# Patient Record
Sex: MALE | Race: WHITE | NOT HISPANIC OR LATINO | URBAN - METROPOLITAN AREA
[De-identification: names, ages, dates, MRNs, and addresses within clinical notes are randomized per-mention and may not be internally consistent; named-entity substitution may affect disease eponyms.]

---

## 2017-03-02 ENCOUNTER — HOSPITAL ENCOUNTER (OUTPATIENT)
Dept: NON INVASIVE DIAGNOSTICS | Facility: HOSPITAL | Age: 47
Discharge: HOME | End: 2017-03-02
Payer: COMMERCIAL

## 2017-03-02 ENCOUNTER — TRANSCRIBE ORDERS (OUTPATIENT)
Dept: ADMINISTRATIVE | Facility: HOSPITAL | Age: 47
End: 2017-03-02

## 2017-03-02 DIAGNOSIS — M79.661 RIGHT CALF PAIN: Primary | ICD-10-CM

## 2017-03-02 DIAGNOSIS — M79.661 RIGHT CALF PAIN: ICD-10-CM

## 2017-03-02 PROCEDURE — 93971 EXTREMITY STUDY: CPT

## 2017-08-09 ENCOUNTER — TRANSCRIBE ORDERS (OUTPATIENT)
Dept: ADMINISTRATIVE | Facility: HOSPITAL | Age: 47
End: 2017-08-09

## 2017-08-10 ENCOUNTER — ALLSCRIPTS OFFICE VISIT (OUTPATIENT)
Dept: OTHER | Facility: OTHER | Age: 47
End: 2017-08-10

## 2017-08-10 ENCOUNTER — HOSPITAL ENCOUNTER (OUTPATIENT)
Dept: RADIOLOGY | Facility: HOSPITAL | Age: 47
Discharge: HOME | End: 2017-08-10
Attending: ORTHOPAEDIC SURGERY
Payer: COMMERCIAL

## 2017-08-10 ENCOUNTER — TRANSCRIBE ORDERS (OUTPATIENT)
Dept: ADMINISTRATIVE | Facility: HOSPITAL | Age: 47
End: 2017-08-10

## 2017-08-10 DIAGNOSIS — M54.50 LOW BACK PAIN: ICD-10-CM

## 2017-08-10 DIAGNOSIS — M53.3 SACROCOCCYGEAL DISORDERS, NOT ELSEWHERE CLASSIFIED: ICD-10-CM

## 2017-08-10 DIAGNOSIS — M54.40 LOW BACK PAIN WITH SCIATICA, SCIATICA LATERALITY UNSPECIFIED, UNSPECIFIED BACK PAIN LATERALITY, UNSPECIFIED CHRONICITY: ICD-10-CM

## 2017-08-10 DIAGNOSIS — M53.3 DISORDERS OF SACRUM: Primary | ICD-10-CM

## 2017-08-10 PROCEDURE — 72110 X-RAY EXAM L-2 SPINE 4/>VWS: CPT

## 2017-08-15 ENCOUNTER — GENERIC CONVERSION - ENCOUNTER (OUTPATIENT)
Dept: OTHER | Facility: OTHER | Age: 47
End: 2017-08-15

## 2017-08-15 ENCOUNTER — APPOINTMENT (OUTPATIENT)
Dept: PHYSICAL THERAPY | Facility: CLINIC | Age: 47
End: 2017-08-15
Payer: COMMERCIAL

## 2017-08-15 DIAGNOSIS — M53.3 SACROCOCCYGEAL DISORDERS, NOT ELSEWHERE CLASSIFIED: ICD-10-CM

## 2017-08-15 DIAGNOSIS — M54.50 LOW BACK PAIN: ICD-10-CM

## 2017-08-15 PROCEDURE — 97140 MANUAL THERAPY 1/> REGIONS: CPT

## 2017-08-15 PROCEDURE — G8990 OTHER PT/OT CURRENT STATUS: HCPCS | Performed by: PHYSICAL THERAPIST

## 2017-08-15 PROCEDURE — G8991 OTHER PT/OT GOAL STATUS: HCPCS | Performed by: PHYSICAL THERAPIST

## 2017-08-15 PROCEDURE — 97162 PT EVAL MOD COMPLEX 30 MIN: CPT

## 2017-08-24 ENCOUNTER — APPOINTMENT (OUTPATIENT)
Dept: PHYSICAL THERAPY | Facility: CLINIC | Age: 47
End: 2017-08-24
Payer: COMMERCIAL

## 2017-08-29 ENCOUNTER — GENERIC CONVERSION - ENCOUNTER (OUTPATIENT)
Dept: OTHER | Facility: OTHER | Age: 47
End: 2017-08-29

## 2018-01-14 VITALS
WEIGHT: 189 LBS | HEART RATE: 111 BPM | BODY MASS INDEX: 25.63 KG/M2 | SYSTOLIC BLOOD PRESSURE: 107 MMHG | DIASTOLIC BLOOD PRESSURE: 73 MMHG

## 2021-08-17 VITALS — HEIGHT: 71 IN | WEIGHT: 197 LBS | BODY MASS INDEX: 27.58 KG/M2

## 2021-08-17 DIAGNOSIS — Z12.11 SCREENING FOR COLON CANCER: Primary | ICD-10-CM

## 2021-08-17 RX ORDER — SODIUM PICOSULFATE, MAGNESIUM OXIDE, AND ANHYDROUS CITRIC ACID 10; 3.5; 12 MG/160ML; G/160ML; G/160ML
LIQUID ORAL
Qty: 320 ML | Refills: 0 | Status: SHIPPED | OUTPATIENT
Start: 2021-08-17 | End: 2021-08-23 | Stop reason: HOSPADM

## 2021-08-21 ENCOUNTER — NURSE TRIAGE (OUTPATIENT)
Dept: OTHER | Facility: OTHER | Age: 51
End: 2021-08-21

## 2021-08-21 NOTE — TELEPHONE ENCOUNTER
Start taking between 5p-9p the night before  Patient was advised to start the pills in the morning and then again at 1am, to finish the pills at the time  Reason for Disposition   [1] Caller has URGENT medicine question about med that PCP or specialist prescribed AND [2] triager unable to answer question    Answer Assessment - Initial Assessment Questions  1  NAME of MEDICATION: "What medicine are you calling about?"      Clenpiq    2  QUESTION: "What is your question?" (e g , medication refill, side effect)      Patient reports he needs clarification on start time and instructions  He was advised Colonoscopy prep would be a pill but he received a liquid     3  PRESCRIBING HCP: "Who prescribed it?" Reason: if prescribed by specialist, call should be referred to that group        GI    Protocols used: MEDICATION QUESTION CALL-ADULT-

## 2021-08-21 NOTE — TELEPHONE ENCOUNTER
[facility] sent to on-call provider, Dr Leander Grande, regarding clarification of colonoscopy prep orders  Patient reports not receiving instructions via email  Per Dr Leander Grande, patient should take half of the Clenpiq prep at 4pm on Sunday and then the other half on Monday around 3am  He is able to have a light breakfast on Sunday, but after that only clear liquids  Nothing to eat or drink 3 hours prior to arrival  Made patient aware of the recommendation and verbalized understanding

## 2021-08-23 ENCOUNTER — HOSPITAL ENCOUNTER (OUTPATIENT)
Dept: GASTROENTEROLOGY | Facility: AMBULATORY SURGERY CENTER | Age: 51
Discharge: HOME | End: 2021-08-23
Payer: COMMERCIAL

## 2021-08-23 ENCOUNTER — ANESTHESIA EVENT (OUTPATIENT)
Dept: GASTROENTEROLOGY | Facility: AMBULATORY SURGERY CENTER | Age: 51
End: 2021-08-23

## 2021-08-23 ENCOUNTER — ANESTHESIA (OUTPATIENT)
Dept: GASTROENTEROLOGY | Facility: AMBULATORY SURGERY CENTER | Age: 51
End: 2021-08-23

## 2021-08-23 VITALS
OXYGEN SATURATION: 99 % | HEART RATE: 58 BPM | SYSTOLIC BLOOD PRESSURE: 121 MMHG | DIASTOLIC BLOOD PRESSURE: 65 MMHG | RESPIRATION RATE: 20 BRPM | TEMPERATURE: 97.7 F

## 2021-08-23 DIAGNOSIS — Z12.11 SPECIAL SCREENING FOR MALIGNANT NEOPLASMS, COLON: ICD-10-CM

## 2021-08-23 PROCEDURE — G0121 COLON CA SCRN NOT HI RSK IND: HCPCS | Performed by: INTERNAL MEDICINE

## 2021-08-23 RX ORDER — SODIUM CHLORIDE, SODIUM LACTATE, POTASSIUM CHLORIDE, CALCIUM CHLORIDE 600; 310; 30; 20 MG/100ML; MG/100ML; MG/100ML; MG/100ML
INJECTION, SOLUTION INTRAVENOUS CONTINUOUS PRN
Status: DISCONTINUED | OUTPATIENT
Start: 2021-08-23 | End: 2021-08-23

## 2021-08-23 RX ORDER — PROPOFOL 10 MG/ML
INJECTION, EMULSION INTRAVENOUS AS NEEDED
Status: DISCONTINUED | OUTPATIENT
Start: 2021-08-23 | End: 2021-08-23

## 2021-08-23 RX ORDER — SODIUM CHLORIDE, SODIUM LACTATE, POTASSIUM CHLORIDE, CALCIUM CHLORIDE 600; 310; 30; 20 MG/100ML; MG/100ML; MG/100ML; MG/100ML
75 INJECTION, SOLUTION INTRAVENOUS CONTINUOUS
Status: DISCONTINUED | OUTPATIENT
Start: 2021-08-23 | End: 2021-08-27 | Stop reason: HOSPADM

## 2021-08-23 RX ADMIN — SODIUM CHLORIDE, SODIUM LACTATE, POTASSIUM CHLORIDE, CALCIUM CHLORIDE: 600; 310; 30; 20 INJECTION, SOLUTION INTRAVENOUS at 07:33

## 2021-08-23 RX ADMIN — PROPOFOL 50 MG: 10 INJECTION, EMULSION INTRAVENOUS at 07:41

## 2021-08-23 RX ADMIN — PROPOFOL 100 MG: 10 INJECTION, EMULSION INTRAVENOUS at 07:38

## 2021-08-23 RX ADMIN — PROPOFOL 100 MG: 10 INJECTION, EMULSION INTRAVENOUS at 07:34

## 2021-08-23 RX ADMIN — PROPOFOL 100 MG: 10 INJECTION, EMULSION INTRAVENOUS at 07:36

## 2021-08-23 RX ADMIN — SODIUM CHLORIDE, SODIUM LACTATE, POTASSIUM CHLORIDE, CALCIUM CHLORIDE 75 ML/HR: 600; 310; 30; 20 INJECTION, SOLUTION INTRAVENOUS at 07:12

## 2021-08-23 NOTE — ANESTHESIA POSTPROCEDURE EVALUATION
Post-Op Assessment Note    CV Status:  Stable  Pain Score: 0    Pain management: adequate     Mental Status:  Alert and awake   Hydration Status:  Euvolemic   PONV Controlled:  Controlled   Airway Patency:  Patent      Post Op Vitals Reviewed: Yes      Staff: CRNA         No complications documented      BP   106/67   Temp      Pulse 57   Resp 16   SpO2 98

## 2021-08-23 NOTE — DISCHARGE INSTRUCTIONS
Hemorrhoids   WHAT YOU NEED TO KNOW:   What are hemorrhoids? Hemorrhoids are swollen blood vessels inside your rectum (internal hemorrhoids) or on your anus (external hemorrhoids)  Sometimes a hemorrhoid may prolapse  This means it extends out of your anus  What increases my risk for hemorrhoids? · Pregnancy or obesity    · Straining or sitting for a long time during bowel movements    · Liver disease    · Weak muscles around the anus caused by older age, rectal surgery, or anal intercourse    · A lack of physical activity    · Chronic diarrhea or constipation    · A low-fiber diet    What are the signs and symptoms of hemorrhoids? · Pain or itching around your anus or inside your rectum    · Swelling or bumps around your anus    · Bright red blood in your bowel movement, on the toilet paper, or in the toilet bowl    · Tissue bulging out of your anus (prolapsed hemorrhoids)    · Incontinence (poor control over urine or bowel movements)    How are hemorrhoids diagnosed? Your healthcare provider will ask about your symptoms, the foods you eat, and your bowel movements  He or she will examine your anus for external hemorrhoids  You may need the following:  · A digital rectal exam  is a test to check for hemorrhoids  Your healthcare provider will put a gloved finger inside your anus to feel for the hemorrhoids  · An anoscopy  is a test that uses a scope (small tube with a light and camera on the end) to look at your hemorrhoids  How are hemorrhoids treated? Treatment will depend on your symptoms  You may need any of the following:  · Medicines  can help decrease pain and swelling, and soften your bowel movement  The medicine may be a pill, pad, cream, or ointment  · Procedures  may be used to shrink or remove your hemorrhoid  Examples include rubber-band ligation, sclerotherapy, and photocoagulation  These procedures may be done in your healthcare provider's office   Ask your healthcare provider for more information about these procedures  · Surgery  may be needed to shrink or remove your hemorrhoids  How can I manage my symptoms? · Apply ice on your anus for 15 to 20 minutes every hour or as directed  Use an ice pack, or put crushed ice in a plastic bag  Cover it with a towel before you apply it to your anus  Ice helps prevent tissue damage and decreases swelling and pain  · Take a sitz bath  Fill a bathtub with 4 to 6 inches of warm water  You may also use a sitz bath pan that fits inside a toilet bowl  Sit in the sitz bath for 15 minutes  Do this 3 times a day, and after each bowel movement  The warm water can help decrease pain and swelling  · Keep your anal area clean  Gently wash the area with warm water daily  Soap may irritate the area  After a bowel movement, wipe with moist towelettes or wet toilet paper  Dry toilet paper can irritate the area  How can I help prevent hemorrhoids? · Do not strain to have a bowel movement  Do not sit on the toilet too long  These actions can increase pressure on the tissues in your rectum and anus  · Drink plenty of liquids  Liquids can help prevent constipation  Ask how much liquid to drink each day and which liquids are best for you  · Eat a variety of high-fiber foods  Examples include fruits, vegetables, and whole grains  Ask your healthcare provider how much fiber you need each day  You may need to take a fiber supplement  · Exercise as directed  Exercise, such as walking, may make it easier to have a bowel movement  Ask your healthcare provider to help you create an exercise plan  · Do not have anal sex  Anal sex can weaken the skin around your rectum and anus  · Avoid heavy lifting  This can cause straining and increase your risk for another hemorrhoid  When should I seek immediate care? · You have severe pain in your rectum or around your anus      · You have severe pain in your abdomen and you are vomiting  · You have bleeding from your anus that soaks through your underwear  When should I contact my healthcare provider? · You have frequent and painful bowel movements  · Your hemorrhoid looks or feels more swollen than usual      · You do not have a bowel movement for 2 days or more  · You see or feel tissue coming through your anus  · You have questions or concerns about your condition or care  CARE AGREEMENT:   You have the right to help plan your care  Learn about your health condition and how it may be treated  Discuss treatment options with your healthcare providers to decide what care you want to receive  You always have the right to refuse treatment  The above information is an  only  It is not intended as medical advice for individual conditions or treatments  Talk to your doctor, nurse or pharmacist before following any medical regimen to see if it is safe and effective for you  © Copyright [facility] 2021 Information is for End User's use only and may not be sold, redistributed or otherwise used for commercial purposes  All illustrations and images included in CareNotes® are the copyrighted property of A D A M , Inc  or [address] [place] [address]  Colonoscopy   WHAT YOU NEED TO KNOW:   A colonoscopy is a procedure to examine the inside of your colon (intestine) with a scope  Polyps or tissue growths may have been removed during your colonoscopy  It is normal to feel bloated and to have some abdominal discomfort  You should be passing gas  If you have hemorrhoids or you had polyps removed, you may have a small amount of bleeding  DISCHARGE INSTRUCTIONS:   Seek care immediately if:    You have sudden, severe abdominal pain   You have problems swallowing   You have a large amount of black, sticky bowel movements or blood in your bowel movements   You have sudden trouble breathing        You feel weak, lightheaded, or faint or your heart beats faster than normal for you  Contact your healthcare provider if:    You have a fever and chills   You have nausea or are vomiting   Your abdomen is bloated or feels full and hard   You have abdominal pain   You have black, sticky bowel movements or blood in your bowel movements   You have not had a bowel movement for 3 days after your procedure   You have rash or hives   You have questions or concerns about your procedure  Activity:    Do not lift, strain, or run for 24 hours after your procedure   Rest after your procedure  You have been given medicine to relax you  Do not drive or make important decisions until the day after your procedure  Return to your normal activity as directed   Relieve gas and discomfort from bloating by lying on your right side with a heating pad on your abdomen  You may need to take short walks to help the gas move out  Eat small meals until bloating is relieved  Follow up with your healthcare provider as directed: Write down your questions so you remember to ask them during your visits  If you take a blood thinner, please review the specific instructions from your endoscopist about when you should resume it  These can be found in the Recommendation and Your Medication list sections of this After Visit Summary  Hemorrhoids   WHAT YOU NEED TO KNOW:   What are hemorrhoids? Hemorrhoids are swollen blood vessels inside your rectum (internal hemorrhoids) or on your anus (external hemorrhoids)  Sometimes a hemorrhoid may prolapse  This means it extends out of your anus  What increases my risk for hemorrhoids?    · Pregnancy or obesity    · Straining or sitting for a long time during bowel movements    · Liver disease    · Weak muscles around the anus caused by older age, rectal surgery, or anal intercourse    · A lack of physical activity    · Chronic diarrhea or constipation    · A low-fiber diet    What are the signs and symptoms of hemorrhoids? · Pain or itching around your anus or inside your rectum    · Swelling or bumps around your anus    · Bright red blood in your bowel movement, on the toilet paper, or in the toilet bowl    · Tissue bulging out of your anus (prolapsed hemorrhoids)    · Incontinence (poor control over urine or bowel movements)    How are hemorrhoids diagnosed? Your healthcare provider will ask about your symptoms, the foods you eat, and your bowel movements  He or she will examine your anus for external hemorrhoids  You may need the following:  · A digital rectal exam  is a test to check for hemorrhoids  Your healthcare provider will put a gloved finger inside your anus to feel for the hemorrhoids  · An anoscopy  is a test that uses a scope (small tube with a light and camera on the end) to look at your hemorrhoids  How are hemorrhoids treated? Treatment will depend on your symptoms  You may need any of the following:  · Medicines  can help decrease pain and swelling, and soften your bowel movement  The medicine may be a pill, pad, cream, or ointment  · Procedures  may be used to shrink or remove your hemorrhoid  Examples include rubber-band ligation, sclerotherapy, and photocoagulation  These procedures may be done in your healthcare provider's office  Ask your healthcare provider for more information about these procedures  · Surgery  may be needed to shrink or remove your hemorrhoids  How can I manage my symptoms? · Apply ice on your anus for 15 to 20 minutes every hour or as directed  Use an ice pack, or put crushed ice in a plastic bag  Cover it with a towel before you apply it to your anus  Ice helps prevent tissue damage and decreases swelling and pain  · Take a sitz bath  Fill a bathtub with 4 to 6 inches of warm water  You may also use a sitz bath pan that fits inside a toilet bowl  Sit in the sitz bath for 15 minutes  Do this 3 times a day, and after each bowel movement   The warm water can help decrease pain and swelling  · Keep your anal area clean  Gently wash the area with warm water daily  Soap may irritate the area  After a bowel movement, wipe with moist towelettes or wet toilet paper  Dry toilet paper can irritate the area  How can I help prevent hemorrhoids? · Do not strain to have a bowel movement  Do not sit on the toilet too long  These actions can increase pressure on the tissues in your rectum and anus  · Drink plenty of liquids  Liquids can help prevent constipation  Ask how much liquid to drink each day and which liquids are best for you  · Eat a variety of high-fiber foods  Examples include fruits, vegetables, and whole grains  Ask your healthcare provider how much fiber you need each day  You may need to take a fiber supplement  · Exercise as directed  Exercise, such as walking, may make it easier to have a bowel movement  Ask your healthcare provider to help you create an exercise plan  · Do not have anal sex  Anal sex can weaken the skin around your rectum and anus  · Avoid heavy lifting  This can cause straining and increase your risk for another hemorrhoid  When should I seek immediate care? · You have severe pain in your rectum or around your anus  · You have severe pain in your abdomen and you are vomiting  · You have bleeding from your anus that soaks through your underwear  When should I contact my healthcare provider? · You have frequent and painful bowel movements  · Your hemorrhoid looks or feels more swollen than usual      · You do not have a bowel movement for 2 days or more  · You see or feel tissue coming through your anus  · You have questions or concerns about your condition or care  CARE AGREEMENT:   You have the right to help plan your care  Learn about your health condition and how it may be treated   Discuss treatment options with your healthcare providers to decide what care you want to receive  You always have the right to refuse treatment  The above information is an  only  It is not intended as medical advice for individual conditions or treatments  Talk to your doctor, nurse or pharmacist before following any medical regimen to see if it is safe and effective for you  © Copyright [address] 2021 Information is for End User's use only and may not be sold, redistributed or otherwise used for commercial purposes   All illustrations and images included in CareNotes® are the copyrighted property of A D A M [employer]  or [address] [place] [address]

## 2021-08-23 NOTE — ANESTHESIA PREPROCEDURE EVALUATION
Procedure:  COLONOSCOPY    Relevant Problems   No relevant active problems      CAD/PCI/MI/CHF -- denies  COPD/ASTHMA/VIKY -- denies  PROBS WITH PRIOR ANESTHESIA -- denies  NPO STATUS CONFIRMED    Lab Results   Component Value Date    WBC 9 26 01/16/2014    HGB 11 6 (L) 01/16/2014     (H) 01/16/2014     Lab Results   Component Value Date    K 3 8 01/16/2014    BUN 13 01/16/2014    CREATININE 0 72 01/16/2014    GLUCOSE 112 01/16/2014     No results found for: PTT   Lab Results   Component Value Date    INR 1 06 01/14/2014       Blood type O    No results found for: HGBA1C      Physical Exam    Airway    Mallampati score: II  TM Distance: >3 FB       Dental       Cardiovascular      Pulmonary  Pulmonary exam normal     Other Findings        Anesthesia Plan  ASA Score- 2     Anesthesia Type- IV sedation with anesthesia with ASA Monitors  Additional Monitors:   Airway Plan:     Comment: ANNA MARIE Torres , have personally seen and evaluated the patient prior to anesthetic care  I have reviewed the pre-anesthetic record, and other medical records if appropriate to the anesthetic care  If a CRNA is involved in the case, I have reviewed the CRNA assessment, if present, and agree  Risks/benefits and alternatives discussed with patient including possible PONV, sore throat, and possibility of rare anesthetic and surgical emergencies          Plan Factors-    Chart reviewed  EKG reviewed  Imaging results reviewed  Existing labs reviewed  Patient summary reviewed  Induction-     Postoperative Plan-     Informed Consent- Anesthetic plan and risks discussed with patient  I personally reviewed this patient with the CRNA  Discussed and agreed on the Anesthesia Plan with the CRNA  Brooke Hart

## 2022-03-01 ENCOUNTER — HOSPITAL ENCOUNTER (EMERGENCY)
Facility: HOSPITAL | Age: 52
Discharge: HOME | End: 2022-03-01
Attending: EMERGENCY MEDICINE | Admitting: EMERGENCY MEDICINE
Payer: COMMERCIAL

## 2022-03-01 ENCOUNTER — APPOINTMENT (EMERGENCY)
Dept: RADIOLOGY | Facility: HOSPITAL | Age: 52
End: 2022-03-01
Payer: COMMERCIAL

## 2022-03-01 VITALS
SYSTOLIC BLOOD PRESSURE: 152 MMHG | HEIGHT: 71 IN | WEIGHT: 197 LBS | BODY MASS INDEX: 27.58 KG/M2 | TEMPERATURE: 97.9 F | OXYGEN SATURATION: 94 % | RESPIRATION RATE: 20 BRPM | HEART RATE: 71 BPM | DIASTOLIC BLOOD PRESSURE: 72 MMHG

## 2022-03-01 DIAGNOSIS — M54.16 LUMBAR RADICULOPATHY, ACUTE: Primary | ICD-10-CM

## 2022-03-01 PROCEDURE — 99284 EMERGENCY DEPT VISIT MOD MDM: CPT | Performed by: PHYSICIAN ASSISTANT

## 2022-03-01 PROCEDURE — 99284 EMERGENCY DEPT VISIT MOD MDM: CPT

## 2022-03-01 PROCEDURE — 72100 X-RAY EXAM L-S SPINE 2/3 VWS: CPT

## 2022-03-01 RX ORDER — LIDOCAINE 50 MG/G
1 PATCH TOPICAL ONCE
Status: DISCONTINUED | OUTPATIENT
Start: 2022-03-01 | End: 2022-03-01 | Stop reason: HOSPADM

## 2022-03-01 RX ORDER — HYDROCODONE BITARTRATE AND ACETAMINOPHEN 5; 325 MG/1; MG/1
1 TABLET ORAL ONCE
Status: COMPLETED | OUTPATIENT
Start: 2022-03-01 | End: 2022-03-01

## 2022-03-01 RX ORDER — HYDROCODONE BITARTRATE AND ACETAMINOPHEN 5; 325 MG/1; MG/1
1 TABLET ORAL EVERY 4 HOURS PRN
Qty: 15 TABLET | Refills: 0 | Status: SHIPPED | OUTPATIENT
Start: 2022-03-01 | End: 2022-03-04

## 2022-03-01 RX ADMIN — LIDOCAINE 5% 1 PATCH: 700 PATCH TOPICAL at 10:27

## 2022-03-01 RX ADMIN — HYDROCODONE BITARTRATE AND ACETAMINOPHEN 1 TABLET: 5; 325 TABLET ORAL at 10:27

## 2022-03-01 NOTE — ED NOTES
Patient ambulatory to stretcher   However patient limping due to pain      Jesusita Kenny RN  03/01/22 [id number]

## 2022-03-01 NOTE — DISCHARGE INSTRUCTIONS
Rest, heating pad to back  Continue prednisone, flexeril, and motrin for pain  Take vicodin for severe pain  Keep appointment with spine and pain management on MOnday  Return to ER if groin numbness or bowel/bladder dysfunction

## 2022-03-01 NOTE — ED PROVIDER NOTES
History  Chief Complaint   Patient presents with    Back Pain     patient presents to the ED with c/o sciatic back pain that began 4 weeks ago, states he saw a chiropractor and it helped with the back pain but the sciatic nerve pain isnt any better      Patient is a 45 y/o M with h/o low back pain with laminectomy that presents to the ED with right lower back pain that radiates down right leg x 4 weeks  He states pain is worse with movement and lying on back and sitting  He denies bowel/bladder dysfunction  No weakness  He states his right leg feels numb at times  No fevers, chills  He went to chiropractor, has been taking flexeril, and prednisone  He has an appointment with spine center on Monday  He states his PCP told him to come to the ER for further imaging  No history of trauma  History provided by:  Patient  Back Pain  Location:  Sacro-iliac joint  Quality:  Aching  Radiates to:  R thigh  Pain severity:  Moderate  Onset quality:  Gradual  Duration:  4 weeks  Timing:  Constant  Progression:  Worsening  Chronicity:  New  Context: not falling, not jumping from heights, not lifting heavy objects, not recent illness, not recent injury and not twisting    Relieved by: certain positions  Worsened by: Movement, lying down and sitting  Ineffective treatments:  NSAIDs and muscle relaxants  Associated symptoms: leg pain and numbness    Associated symptoms: no abdominal pain, no abdominal swelling, no bladder incontinence, no bowel incontinence, no dysuria, no fever, no headaches, no pelvic pain, no perianal numbness, no tingling and no weakness        None       History reviewed  No pertinent past medical history  Past Surgical History:   Procedure Laterality Date    BACK SURGERY      Laminectomy        Family History   Problem Relation Age of Onset    Heart disease Father     Stroke Father      I have reviewed and agree with the history as documented      E-Cigarette/Vaping    E-Cigarette Use Never User      E-Cigarette/Vaping Substances    Nicotine No     THC No     CBD No     Flavoring No     Other No     Unknown No      Social History     Tobacco Use    Smoking status: Never Smoker    Smokeless tobacco: Never Used   Vaping Use    Vaping Use: Never used   Substance Use Topics    Alcohol use: Yes     Comment: occasional     Drug use: Never       Review of Systems   Constitutional: Negative for chills and fever  Gastrointestinal: Negative for abdominal pain and bowel incontinence  Genitourinary: Negative for bladder incontinence, difficulty urinating, dysuria and pelvic pain  Musculoskeletal: Positive for back pain  Negative for neck pain  Skin: Negative for color change and rash  Neurological: Positive for numbness  Negative for dizziness, tingling, facial asymmetry, speech difficulty, weakness, light-headedness and headaches  All other systems reviewed and are negative  Physical Exam  Physical Exam  Vitals and nursing note reviewed  Constitutional:       General: He is in acute distress (patient appears to be in pain, lying on left side  )  Appearance: Normal appearance  He is well-developed, well-groomed and normal weight  He is not ill-appearing or diaphoretic  HENT:      Head: Normocephalic and atraumatic  Right Ear: External ear normal       Left Ear: External ear normal       Nose: Nose normal    Eyes:      Conjunctiva/sclera: Conjunctivae normal    Cardiovascular:      Rate and Rhythm: Normal rate and regular rhythm  Heart sounds: Normal heart sounds  Pulmonary:      Effort: Pulmonary effort is normal       Breath sounds: Normal breath sounds  No wheezing, rhonchi or rales  Abdominal:      General: Abdomen is flat  Bowel sounds are normal       Palpations: Abdomen is soft  Tenderness: There is no abdominal tenderness  Musculoskeletal:      Cervical back: Normal and normal range of motion   No spinous process tenderness or muscular tenderness  Thoracic back: Normal       Lumbar back: Tenderness (over right SI joint) present  No swelling, deformity or bony tenderness  Decreased range of motion (due to pain)  Negative right straight leg raise test and negative left straight leg raise test    Skin:     General: Skin is warm and dry  Coloration: Skin is not pale  Findings: No bruising  Neurological:      Mental Status: He is alert and oriented to person, place, and time  Cranial Nerves: Cranial nerves are intact  Sensory: Sensation is intact  No sensory deficit  Motor: No weakness, tremor or abnormal muscle tone  Deep Tendon Reflexes:      Reflex Scores:       Patellar reflexes are 2+ on the right side and 2+ on the left side  Comments: Patient able to dorsiflex b/l great toe  Psychiatric:         Mood and Affect: Mood normal          Speech: Speech normal          Behavior: Behavior is cooperative  Vital Signs  ED Triage Vitals   Temperature Pulse Respirations Blood Pressure SpO2   03/01/22 0919 03/01/22 0919 03/01/22 0919 03/01/22 0919 03/01/22 0919   97 9 °F (36 6 °C) 104 20 156/93 100 %      Temp src Heart Rate Source Patient Position - Orthostatic VS BP Location FiO2 (%)   -- 03/01/22 0919 -- -- --    Monitor         Pain Score       03/01/22 0917       8           Vitals:    03/01/22 1030 03/01/22 1045 03/01/22 1100 03/01/22 1115   BP: 140/80  152/72    Pulse: 84 78 69 71         Visual Acuity      ED Medications  Medications   lidocaine (LIDODERM) 5 % patch 1 patch (1 patch Topical Medication Applied 3/1/22 1027)   HYDROcodone-acetaminophen (NORCO) 5-325 mg per tablet 1 tablet (1 tablet Oral Given 3/1/22 1027)       Diagnostic Studies  Results Reviewed     None                 XR lumbar spine 2 or 3 views   ED Interpretation by Kathe Mckeon PA-C (03/01 1029)   Degenerative changes  No acute abnormalities  Straightening of normal lordosis  Procedures  Procedures         ED Course                               SBIRT 20yo+      Most Recent Value   SBIRT (24 yo +)    In order to provide better care to our patients, we are screening all of our patients for alcohol and drug use  Would it be okay to ask you these screening questions? No Filed at: 03/01/2022 9436   Initial Alcohol Screen: US AUDIT-C     1  How often do you have a drink containing alcohol? 1 Filed at: 03/01/2022 0951   2  How many drinks containing alcohol do you have on a typical day you are drinking? 0 Filed at: 03/01/2022 0951   3a  Male UNDER 65: How often do you have five or more drinks on one occasion? 0 Filed at: 03/01/2022 0951   3b  FEMALE Any Age, or MALE 65+: How often do you have 4 or more drinks on one occassion? 0 Filed at: 03/01/2022 0951   Audit-C Score 1 Filed at: 03/01/2022 1634   KENYA: How many times in the past year have you    Used an illegal drug or used a prescription medication for non-medical reasons? Never Filed at: 03/01/2022 5468                    MDM  Number of Diagnoses or Management Options  Lumbar radiculopathy, acute: established and worsening  Diagnosis management comments: Patient with low back pain radiating down right leg, no bowel/bladder dysfunction, groin numbness, fevers, or weakness, no need for emergent MRI  Patient does have appt with specialist in 6 days  He has tried muscle relaxants, NSAIDS, steroids, without relief  He did receive relief with vicodin, reviewed PDMP, no recent narcotic prescriptions, will give short course until he can get into pain management  Patient did throw away the lidocaine patch because he said they never work  Return precautions given          Amount and/or Complexity of Data Reviewed  Tests in the radiology section of CPT®: ordered and reviewed  Independent visualization of images, tracings, or specimens: yes    Patient Progress  Patient progress: stable      Disposition  Final diagnoses:   Lumbar radiculopathy, acute     Time reflects when diagnosis was documented in both MDM as applicable and the Disposition within this note     Time User Action Codes Description Comment    3/1/2022 11:22 AM Corbin Lancaster Add [M54 16] Lumbar radiculopathy, acute       ED Disposition     ED Disposition Condition Date/Time Comment    Discharge Stable Tue Mar 1, 2022 11:15 AM Mckinley Saucedo discharge to home/self care  Follow-up Information     Follow up With Specialties Details Why [address], DO Family Medicine In 3 days For recheck [address] [facility]            Discharge Medication List as of 3/1/2022 11:25 AM      START taking these medications    Details   HYDROcodone-acetaminophen (Norco) 5-325 mg per tablet Take 1 tablet by mouth every 4 (four) hours as needed for pain for up to 3 days Max Daily Amount: 6 tablets, Starting Tue 3/1/2022, Until Fri 3/4/2022 at 2359, Normal             No discharge procedures on file      PDMP Review       Value Time User    PDMP Reviewed  Yes 3/1/2022 12:24 Annie Lamar PA-C          ED Provider  Electronically Signed by           Lisa Gutierrez PA-C  03/01/22 5613

## 2022-03-01 NOTE — ED NOTES
Patient removed lidoderm patch as the cool sensation was causing discomfort        Herbert Maravilla RN  03/01/22 4339

## 2023-09-15 ENCOUNTER — HOSPITAL ENCOUNTER (OUTPATIENT)
Dept: CT IMAGING | Facility: HOSPITAL | Age: 53
Discharge: HOME | End: 2023-09-15
Attending: FAMILY MEDICINE
Payer: COMMERCIAL

## 2023-09-15 DIAGNOSIS — Z91.89 OTHER SPECIFIED PERSONAL RISK FACTORS, NOT ELSEWHERE CLASSIFIED: ICD-10-CM

## 2023-09-15 PROCEDURE — 75571 CT HRT W/O DYE W/CA TEST: CPT

## 2023-09-15 PROCEDURE — G1004 CDSM NDSC: HCPCS

## 2023-10-11 ENCOUNTER — OFFICE VISIT (OUTPATIENT)
Dept: CARDIOLOGY CLINIC | Facility: CLINIC | Age: 53
End: 2023-10-11
Payer: COMMERCIAL

## 2023-10-11 VITALS
SYSTOLIC BLOOD PRESSURE: 132 MMHG | DIASTOLIC BLOOD PRESSURE: 80 MMHG | HEART RATE: 68 BPM | WEIGHT: 199.6 LBS | BODY MASS INDEX: 27.94 KG/M2 | HEIGHT: 71 IN

## 2023-10-11 DIAGNOSIS — Z82.49 FAMILY HISTORY OF HEART DISEASE: ICD-10-CM

## 2023-10-11 DIAGNOSIS — Z76.89 ENCOUNTER TO ESTABLISH CARE: Primary | ICD-10-CM

## 2023-10-11 DIAGNOSIS — Z13.6 SCREENING FOR HEART DISEASE: ICD-10-CM

## 2023-10-11 DIAGNOSIS — R07.89 CHEST TIGHTNESS: ICD-10-CM

## 2023-10-11 DIAGNOSIS — E78.2 MIXED HYPERLIPIDEMIA: ICD-10-CM

## 2023-10-11 PROCEDURE — 99204 OFFICE O/P NEW MOD 45 MIN: CPT | Performed by: INTERNAL MEDICINE

## 2023-10-11 PROCEDURE — 93000 ELECTROCARDIOGRAM COMPLETE: CPT | Performed by: INTERNAL MEDICINE

## 2023-10-11 NOTE — PROGRESS NOTES
Cardiology Consultation     Lissett Zurita  [id number]  1970  HEART & VASCULAR [facility] [place]  [address] [facility] [address]    1. Encounter to establish care  POCT ECG      2. Screening for heart disease  Stress test only, exercise    Echo complete w/ contrast if indicated      3. Family history of heart disease  Stress test only, exercise    Echo complete w/ contrast if indicated      4. Mixed hyperlipidemia  Stress test only, exercise    Echo complete w/ contrast if indicated      5. Chest tightness  Stress test only, exercise    Echo complete w/ contrast if indicated        Chief Complaint   Patient presents with    Advice Only     No cardiac complaints, here to establish care. HPI: Patient is here for establishment of cardiac care and evaluation of cardiac risk factors. Family history of premature heart disease in father and paternal GF along with cousin on his father's side with heart disease/MI/death in 52's. No reported palpitations, lightheadedness, syncope, LE edema, orthopnea, PND, or significant weight changes. Patient remains active without any increased fatigue out of the ordinary. He does exercise, and does push himself, but does get some tightness and shortness of breath with exertion at times. Patient Active Problem List   Diagnosis    Screening for heart disease    Family history of heart disease    Mixed hyperlipidemia    Chest tightness     History reviewed. No pertinent past medical history.   Social History     Socioeconomic History    Marital status: /Civil Union     Spouse name: Not on file    Number of children: Not on file    Years of education: Not on file    Highest education level: Not on file   Occupational History    Not on file   Tobacco Use    Smoking status: Never    Smokeless tobacco: Never   Vaping Use    Vaping Use: Never used   Substance and Sexual Activity Alcohol use: Yes     Comment: occasional     Drug use: Never    Sexual activity: Not on file   Other Topics Concern    Not on file   Social History Narrative    Not on file     Social Determinants of Health     Financial Resource Strain: Not on file   Food Insecurity: Not on file   Transportation Needs: Not on file   Physical Activity: Not on file   Stress: Not on file   Social Connections: Not on file   Intimate Partner Violence: Not on file   Housing Stability: Not on file      Family History   Problem Relation Age of Onset    Heart disease Father     Stroke Father      Past Surgical History:   Procedure Laterality Date    BACK SURGERY      Laminectomy        Current Outpatient Medications:     Atorvastatin Calcium 20 MG/5ML SUSP, , Disp: , Rfl:   No Known Allergies  Vitals:    10/11/23 0941   BP: 132/80   BP Location: Right arm   Patient Position: Sitting   Cuff Size: Large   Pulse: 68   Weight: 90.5 kg (199 lb 9.6 oz)   Height: 5' 11" (1.803 m)       Labs:  No visits with results within 6 Month(s) from this visit.    Latest known visit with results is:   Conversion Encounter on 01/01/2014   Component Date Value    WBC 01/01/2014 12.12 (H)     RBC 01/01/2014 4.38     Hemoglobin 01/01/2014 12.6     Hematocrit 01/01/2014 38.2     MCV 01/01/2014 87     MCH 01/01/2014 28.8     MCHC 01/01/2014 33.0     RDW 01/01/2014 12.2     Platelets 50/40/3819 259     Neutrophils Relative 01/01/2014 65     Lymphocytes Relative 01/01/2014 17     Monocytes Relative 01/01/2014 15 (H)     Eosinophils Relative 01/01/2014 3     Basophils Relative 01/01/2014 0     Neutrophils Absolute 01/01/2014 7.88 (H)     Lymphocytes Absolute 01/01/2014 2.06     Monocytes Absolute 01/01/2014 1.82 (H)     Eosinophils Absolute 01/01/2014 0.36     Basophils Absolute 01/01/2014 0.00     MPV 01/01/2014 9.0     Sodium 01/01/2014 139     Potassium 01/01/2014 4.2     Chloride 01/01/2014 101     CO2 01/01/2014 34 (H)     Anion Gap 01/01/2014 4     Glucose 01/01/2014 114     BUN 01/01/2014 12     Calcium 01/01/2014 9.5     Creatinine 01/01/2014 0.70     AAGFR 01/01/2014 >60     NAAGFR 01/01/2014 >60     WBC 01/01/2014 12.54 (H)     RBC 01/01/2014 4.40     Hemoglobin 01/01/2014 12.6     Hematocrit 01/01/2014 38.5     MCV 01/01/2014 88     MCH 01/01/2014 28.6     MCHC 01/01/2014 32.7     RDW 01/01/2014 12.3     Platelets 99/95/5147 238     MPV 01/01/2014 9.5      No results found for: "CHOL", "TRIG", "HDL", "LDLDIRECT"  Imaging: CT coronary calcium score    Result Date: 9/20/2023  Narrative: CT CORONARY ARTERY CALCIUM SCREENING WITHOUT INTRAVENOUS CONTRAST INDICATION:  Z91.89: Other specified personal risk factors, not elsewhere classified. Assess for calcific coronary plaque. Patient Age:  48 years Patient Gender:  Male. COMPARISON:  None. TECHNIQUE: Low radiation dose computed tomography of the heart was performed with EKG gating, suspended respiration, and without intravenous contrast.  This examination, like all CT scans performed in the [facility], was performed utilizing techniques to minimize radiation dose exposure, including the use of iterative reconstruction and automated exposure control. Postprocessing was performed on a 3-D computer workstation to measure the amount of calcium in the coronary arteries. Imaging was limited to the heart and the immediately adjacent lungs.  FINDINGS: Coronary artery calcium score breakdown is as follows: Left main coronary artery:  0 Left anterior descending coronary artery and diagonal branches:  0 Left circumflex coronary artery and left marginal branches:  19 Right coronary artery and right marginal branches:  0 Posterior descending coronary artery: 0 TOTAL coronary calcium score:  19 Calcium score PERCENTILE of age, race, and gender matched database participants in the Multi-Ethnic Study of Atherosclerosis (ARELLANO) trial:   65 HEART/GREAT [place]SELS: No significant low radiation dose noncontrast CT abnormality of the heart. No thoracic aortic aneurysm. EXTRACARDIAC FINDINGS: No significant findings identified on limited small field of view low radiation dose noncontrast images of the chest at the level of the heart. Impression: Total coronary calcium score equals 19. For more useful information regarding the prognostic significance of the calcium score, please consult the calculator at the website [email or web address]://www.helen-young.org/. aspx. Workstation performed: [id number]       Review of Systems:  Review of Systems   Constitutional:  Negative for activity change, appetite change, fatigue and fever. HENT:  Negative for nosebleeds and sore throat. Eyes:  Negative for photophobia and visual disturbance. Respiratory:  Positive for chest tightness and shortness of breath. Negative for cough and wheezing. Cardiovascular:  Negative for chest pain, palpitations and leg swelling. Gastrointestinal:  Negative for abdominal pain, diarrhea, nausea and vomiting. Endocrine: Negative for polyuria. Genitourinary:  Negative for dysuria, frequency and hematuria. Musculoskeletal:  Negative for arthralgias, back pain and gait problem. Skin:  Negative for pallor and rash. Neurological:  Negative for dizziness, syncope, speech difficulty and light-headedness. Hematological:  Does not bruise/bleed easily. Psychiatric/Behavioral:  Negative for agitation, behavioral problems and confusion. Physical Exam:  Physical Exam  Vitals reviewed. Constitutional:       General: He is not in acute distress. Appearance: He is well-developed. He is not diaphoretic. HENT:      Head: Normocephalic and atraumatic. Nose: Nose normal.   Eyes:      General: No scleral icterus. Pupils: Pupils are equal, round, and reactive to light. Neck:      Vascular: No JVD. Cardiovascular:      Rate and Rhythm: Normal rate and regular rhythm.       Heart sounds: S1 normal and S2 normal. Heart sounds not distant. No murmur heard. No systolic murmur is present. No friction rub. No gallop. No S3 sounds. Pulmonary:      Effort: Pulmonary effort is normal. No respiratory distress. Breath sounds: Normal breath sounds. No wheezing or rales. Abdominal:      General: Bowel sounds are normal. There is no distension. Palpations: Abdomen is soft. Musculoskeletal:         General: No deformity. Cervical back: Normal range of motion and neck supple. Skin:     General: Skin is warm and dry. Findings: No erythema. Neurological:      Mental Status: He is alert and oriented to person, place, and time. Cranial Nerves: No cranial nerve deficit. Psychiatric:         Behavior: Behavior normal.       Blood pressure 132/80, pulse 68, height 5' 11" (1.803 m), weight 90.5 kg (199 lb 9.6 oz). EKG:  Normal sinus rhythm  Normal ECG    Discussion/Summary:  Evaluation of cardiac risk factors and screening for heart disease: Patient has a coronary artery calcium score of 19 in the left circumflex distribution, placing him in the 65th percentile for age and gender matched individuals. Has cardiac risk factors including premature family history of heart disease, almost age, male, and hyperlipidemia currently on statin. Most recent lipid panel was done in Sept 2023 revealing  TG 89 HDL 65 and . We will also check treadmill stress test along with echocardiogram to evaluate for any structural heart disease, especially in light of symptoms more recently.

## 2023-10-11 NOTE — LETTER
October 11, 2023     [address] [address]    Patient: Angel Obregon   YOB: 1970   Date of Visit: 10/11/2023       Dear Dr. Raffaele Roque:    Thank you for referring Tristin Smallwood to me for evaluation. Below are my notes for this consultation. If you have questions, please do not hesitate to call me. I look forward to following your patient along with you. Sincerely,        Laura Morgan MD        CC: No Recipients    Laura Morgan MD  10/11/2023  9:56 AM  Incomplete                                             Cardiology Consultation     Angel Obregon  [id number]  1970  [facility] [place]  [address] [facility] [id number]    1. Encounter to establish care  POCT ECG        Chief Complaint   Patient presents with   • Advice Only     No cardiac complaints, here to establish care. HPI: Patient is here for establishment of cardiac care and evaluation of cardiac risk factors. Patient feels well, without complaints. No reported chest pain, shortness of breath, palpitations, lightheadedness, syncope, LE edema, orthopnea, PND, or significant weight changes. Patient remains active without any increased fatigue out of the ordinary. There is no problem list on file for this patient. No past medical history on file.   Social History     Socioeconomic History   • Marital status: /Civil Union     Spouse name: Not on file   • Number of children: Not on file   • Years of education: Not on file   • Highest education level: Not on file   Occupational History   • Not on file   Tobacco Use   • Smoking status: Never   • Smokeless tobacco: Never   Vaping Use   • Vaping Use: Never used   Substance and Sexual Activity   • Alcohol use: Yes     Comment: occasional    • Drug use: Never   • Sexual activity: Not on file   Other Topics Concern   • Not on file   Social History Narrative   • Not on file     Social Determinants of Health     Financial Resource Strain: Not on file   Food Insecurity: Not on file   Transportation Needs: Not on file   Physical Activity: Not on file   Stress: Not on file   Social Connections: Not on file   Intimate Partner Violence: Not on file   Housing Stability: Not on file      Family History   Problem Relation Age of Onset   • Heart disease Father    • Stroke Father      Past Surgical History:   Procedure Laterality Date   • BACK SURGERY      Laminectomy        Current Outpatient Medications:   •  Atorvastatin Calcium 20 MG/5ML SUSP, , Disp: , Rfl:   No Known Allergies  Vitals:    10/11/23 0941   BP: 132/80   BP Location: Right arm   Patient Position: Sitting   Cuff Size: Large   Pulse: 68   Weight: 90.5 kg (199 lb 9.6 oz)   Height: 5' 11" (1.803 m)       Labs:  No visits with results within 6 Month(s) from this visit.    Latest known visit with results is:   Conversion Encounter on 01/01/2014   Component Date Value   • WBC 01/01/2014 12.12 (H)    • RBC 01/01/2014 4.38    • Hemoglobin 01/01/2014 12.6    • Hematocrit 01/01/2014 38.2    • MCV 01/01/2014 87    • MCH 01/01/2014 28.8    • MCHC 01/01/2014 33.0    • RDW 01/01/2014 12.2    • Platelets 11/16/6722 259    • Neutrophils Relative 01/01/2014 65    • Lymphocytes Relative 01/01/2014 17    • Monocytes Relative 01/01/2014 15 (H)    • Eosinophils Relative 01/01/2014 3    • Basophils Relative 01/01/2014 0    • Neutrophils Absolute 01/01/2014 7.88 (H)    • Lymphocytes Absolute 01/01/2014 2.06    • Monocytes Absolute 01/01/2014 1.82 (H)    • Eosinophils Absolute 01/01/2014 0.36    • Basophils Absolute 01/01/2014 0.00    • MPV 01/01/2014 9.0    • Sodium 01/01/2014 139    • Potassium 01/01/2014 4.2    • Chloride 01/01/2014 101    • CO2 01/01/2014 34 (H)    • Anion Gap 01/01/2014 4    • Glucose 01/01/2014 114    • BUN 01/01/2014 12    • Calcium 01/01/2014 9.5    • Creatinine 01/01/2014 0.70    • AAGFR 01/01/2014 >60    • NAAGFR 01/01/2014 >60    • WBC 01/01/2014 12.54 (H)    • RBC 01/01/2014 4.40    • Hemoglobin 01/01/2014 12.6    • Hematocrit 01/01/2014 38.5    • MCV 01/01/2014 88    • MCH 01/01/2014 28.6    • MCHC 01/01/2014 32.7    • RDW 01/01/2014 12.3    • Platelets 51/81/2184 238    • MPV 01/01/2014 9.5      No results found for: "CHOL", "TRIG", "HDL", "LDLDIRECT"  Imaging: CT coronary calcium score    Result Date: 9/20/2023  Narrative: CT CORONARY ARTERY CALCIUM SCREENING WITHOUT INTRAVENOUS CONTRAST INDICATION:  Z91.89: Other specified personal risk factors, not elsewhere classified. Assess for calcific coronary plaque. Patient Age:  48 years Patient Gender:  Male. COMPARISON:  None. TECHNIQUE: Low radiation dose computed tomography of the heart was performed with EKG gating, suspended respiration, and without intravenous contrast.  This examination, like all CT scans performed in the [facility], was performed utilizing techniques to minimize radiation dose exposure, including the use of iterative reconstruction and automated exposure control. Postprocessing was performed on a 3-D computer workstation to measure the amount of calcium in the coronary arteries. Imaging was limited to the heart and the immediately adjacent lungs. FINDINGS: Coronary artery calcium score breakdown is as follows: Left main coronary artery:  0 Left anterior descending coronary artery and diagonal branches:  0 Left circumflex coronary artery and left marginal branches:  19 Right coronary artery and right marginal branches:  0 Posterior descending coronary artery: 0 TOTAL coronary calcium score:  19 Calcium score PERCENTILE of age, race, and gender matched database participants in the Multi-Ethnic Study of Atherosclerosis (ARELLANO) trial:   72 HEART/GREAT VESSELS: No significant low radiation dose noncontrast CT abnormality of the heart. No thoracic aortic aneurysm.  EXTRACARDIAC FINDINGS: No significant findings identified on limited small field of view low radiation dose noncontrast images of the chest at the level of the heart. Impression: Total coronary calcium score equals 19. For more useful information regarding the prognostic significance of the calcium score, please consult the calculator at the website [email or web address]://[email or web address].helen-[email or web address]/. aspx. Workstation performed: [id number]       Review of Systems:  Review of Systems   Constitutional:  Negative for activity change, appetite change, fatigue and fever. HENT:  Negative for nosebleeds and sore throat. Eyes:  Negative for photophobia and visual disturbance. Respiratory:  Negative for cough, chest tightness, shortness of breath and wheezing. Cardiovascular:  Negative for chest pain, palpitations and leg swelling. Gastrointestinal:  Negative for abdominal pain, diarrhea, nausea and vomiting. Endocrine: Negative for polyuria. Genitourinary:  Negative for dysuria, frequency and hematuria. Musculoskeletal:  Negative for arthralgias, back pain and gait problem. Skin:  Negative for pallor and rash. Neurological:  Negative for dizziness, syncope, speech difficulty and light-headedness. Hematological:  Does not bruise/bleed easily. Psychiatric/Behavioral:  Negative for agitation, behavioral problems and confusion. Physical Exam:  Physical Exam  Vitals reviewed. Constitutional:       General: He is not in acute distress. Appearance: He is well-developed. He is not diaphoretic. HENT:      Head: Normocephalic and atraumatic. Nose: Nose normal.   Eyes:      General: No scleral icterus. Pupils: Pupils are equal, round, and reactive to light. Neck:      Vascular: No JVD. Cardiovascular:      Rate and Rhythm: Normal rate and regular rhythm. Heart sounds: S1 normal and S2 normal. Heart sounds not distant. No murmur heard. No systolic murmur is present. No friction rub. No gallop. No S3 sounds.    Pulmonary:      Effort: Pulmonary effort is normal. No respiratory distress. Breath sounds: Normal breath sounds. No wheezing or rales. Abdominal:      General: Bowel sounds are normal. There is no distension. Palpations: Abdomen is soft. Musculoskeletal:         General: No deformity. Cervical back: Normal range of motion and neck supple. Skin:     General: Skin is warm and dry. Findings: No erythema. Neurological:      Mental Status: He is alert and oriented to person, place, and time. Cranial Nerves: No cranial nerve deficit. Psychiatric:         Behavior: Behavior normal.       Blood pressure 132/80, pulse 68, height 5' 11" (1.803 m), weight 90.5 kg (199 lb 9.6 oz). EKG:      Discussion/Summary:  Evaluation of cardiac risk factors and screening for heart disease: Patient has a coronary artery calcium score of 19 in the left circumflex distribution, placing him in the 65th percentile for age and gender matched individuals. Has cardiac risk factors including family history of heart disease, almost age, male, and hyperlipidemia currently on statin. No recent lipid panel on file, will check now. We will also check treadmill stress test along with echocardiogram to evaluate for any structural heart disease.

## 2023-10-31 ENCOUNTER — HOSPITAL ENCOUNTER (OUTPATIENT)
Dept: NON INVASIVE DIAGNOSTICS | Facility: CLINIC | Age: 53
Discharge: HOME | End: 2023-10-31
Payer: COMMERCIAL

## 2023-10-31 VITALS
HEART RATE: 65 BPM | BODY MASS INDEX: 27.86 KG/M2 | DIASTOLIC BLOOD PRESSURE: 86 MMHG | SYSTOLIC BLOOD PRESSURE: 142 MMHG | WEIGHT: 199 LBS | OXYGEN SATURATION: 98 % | HEIGHT: 71 IN

## 2023-10-31 VITALS
WEIGHT: 199 LBS | HEART RATE: 68 BPM | BODY MASS INDEX: 27.86 KG/M2 | SYSTOLIC BLOOD PRESSURE: 132 MMHG | DIASTOLIC BLOOD PRESSURE: 80 MMHG | HEIGHT: 71 IN

## 2023-10-31 DIAGNOSIS — Z13.6 SCREENING FOR HEART DISEASE: ICD-10-CM

## 2023-10-31 DIAGNOSIS — E78.2 MIXED HYPERLIPIDEMIA: ICD-10-CM

## 2023-10-31 DIAGNOSIS — Z82.49 FAMILY HISTORY OF HEART DISEASE: ICD-10-CM

## 2023-10-31 DIAGNOSIS — R07.89 CHEST TIGHTNESS: ICD-10-CM

## 2023-10-31 LAB
AORTIC ROOT: 3.6 CM
APICAL FOUR CHAMBER EJECTION FRACTION: 55 %
ASCENDING AORTA: 3.8 CM
E WAVE DECELERATION TIME: 178 MS
E/A RATIO: 1.38
FRACTIONAL SHORTENING: 29 (ref 28–44)
INTERVENTRICULAR SEPTUM IN DIASTOLE (PARASTERNAL SHORT AXIS VIEW): 1.1 CM
INTERVENTRICULAR SEPTUM: 1.1 CM (ref 0.6–1.1)
LAAS-AP2: 18.7 CM2
LAAS-AP4: 15 CM2
LEFT ATRIUM SIZE: 3.7 CM
LEFT ATRIUM VOLUME (MOD BIPLANE): 48 ML
LEFT ATRIUM VOLUME INDEX (MOD BIPLANE): 22.7 ML/M2
LEFT INTERNAL DIMENSION IN SYSTOLE: 3.5 CM (ref 2.1–4)
LEFT VENTRICULAR INTERNAL DIMENSION IN DIASTOLE: 4.9 CM (ref 3.5–6)
LEFT VENTRICULAR POSTERIOR WALL IN END DIASTOLE: 1 CM
LEFT VENTRICULAR STROKE VOLUME: 61 ML
LVSV (TEICH): 61 ML
MAX DIASTOLIC BP: 80 MMHG
MAX HR PERCENT: 88 %
MAX HR: 148 BPM
MAX PREDICTED HEART RATE: 167 BPM
MV E'TISSUE VEL-LAT: 12 CM/S
MV E'TISSUE VEL-SEP: 11 CM/S
MV PEAK A VEL: 0.65 M/S
MV PEAK E VEL: 90 CM/S
MV STENOSIS PRESSURE HALF TIME: 51 MS
MV VALVE AREA P 1/2 METHOD: 4.31
PROTOCOL NAME: NORMAL
RA PRESSURE ESTIMATED: 3 MMHG
RATE PRESSURE PRODUCT: NORMAL
RIGHT ATRIUM AREA SYSTOLE A4C: 17.1 CM2
RIGHT VENTRICLE ID DIMENSION: 3.9 CM
RV PSP: 18 MMHG
SL CV LEFT ATRIUM LENGTH A2C: 6 CM
SL CV LV EF: 55
SL CV PED ECHO LEFT VENTRICLE DIASTOLIC VOLUME (MOD BIPLANE) 2D: 111 ML
SL CV PED ECHO LEFT VENTRICLE SYSTOLIC VOLUME (MOD BIPLANE) 2D: 50 ML
SL CV STRESS RECOVERY BP: NORMAL MMHG
SL CV STRESS RECOVERY HR: 79 BPM
SL CV STRESS RECOVERY O2 SAT: 99 %
SL CV STRESS STAGE REACHED: 4
STRESS ANGINA INDEX: 1
STRESS BASELINE BP: NORMAL MMHG
STRESS BASELINE HR: 65 BPM
STRESS DUKE TREADMILL SCORE: 1
STRESS O2 SAT REST: 98 %
STRESS PEAK HR: 146 BPM
STRESS POST ESTIMATED WORKLOAD: 13.4 METS
STRESS POST EXERCISE DUR MIN: 10 MIN
STRESS POST EXERCISE DUR MIN: 10 MIN
STRESS POST EXERCISE DUR SEC: 0 SEC
STRESS POST EXERCISE DUR SEC: 0 SEC
STRESS POST O2 SAT PEAK: 98 %
STRESS POST PEAK BP: 210 MMHG
STRESS POST PEAK HR: 148 BPM
STRESS POST PEAK SYSTOLIC BP: 210 MMHG
STRESS ST DEPRESSION: 1 MM
TARGET HR FORMULA: NORMAL
TEST INDICATION: NORMAL
TR MAX PG: 15 MMHG
TR PEAK VELOCITY: 2 M/S
TRICUSPID ANNULAR PLANE SYSTOLIC EXCURSION: 2.4 CM
TRICUSPID VALVE PEAK REGURGITATION VELOCITY: 1.95 M/S

## 2023-10-31 PROCEDURE — 93016 CV STRESS TEST SUPVJ ONLY: CPT | Performed by: INTERNAL MEDICINE

## 2023-10-31 PROCEDURE — 93018 CV STRESS TEST I&R ONLY: CPT | Performed by: INTERNAL MEDICINE

## 2023-10-31 PROCEDURE — 93306 TTE W/DOPPLER COMPLETE: CPT

## 2023-10-31 PROCEDURE — 93306 TTE W/DOPPLER COMPLETE: CPT | Performed by: INTERNAL MEDICINE

## 2023-10-31 PROCEDURE — 93017 CV STRESS TEST TRACING ONLY: CPT

## 2023-12-07 ENCOUNTER — OFFICE VISIT (OUTPATIENT)
Dept: CARDIOLOGY CLINIC | Facility: CLINIC | Age: 53
End: 2023-12-07
Payer: COMMERCIAL

## 2023-12-07 VITALS
WEIGHT: 199.2 LBS | RESPIRATION RATE: 18 BRPM | BODY MASS INDEX: 27.89 KG/M2 | OXYGEN SATURATION: 98 % | DIASTOLIC BLOOD PRESSURE: 84 MMHG | SYSTOLIC BLOOD PRESSURE: 130 MMHG | HEART RATE: 71 BPM | HEIGHT: 71 IN

## 2023-12-07 DIAGNOSIS — Z82.49 FAMILY HISTORY OF HEART DISEASE: Primary | ICD-10-CM

## 2023-12-07 DIAGNOSIS — Z13.6 SCREENING FOR HEART DISEASE: ICD-10-CM

## 2023-12-07 DIAGNOSIS — E78.2 MIXED HYPERLIPIDEMIA: ICD-10-CM

## 2023-12-07 PROCEDURE — 99214 OFFICE O/P EST MOD 30 MIN: CPT | Performed by: INTERNAL MEDICINE

## 2023-12-07 RX ORDER — MULTIVIT WITH MINERALS/LUTEIN
1000 TABLET ORAL DAILY
COMMUNITY

## 2023-12-07 NOTE — PROGRESS NOTES
Cardiology Consultation     Stefani Blum  [id number]  1970  [facility] [place]  [address] [facility] [address]    1. Family history of heart disease        2. Mixed hyperlipidemia        3. Screening for heart disease          Chief Complaint   Patient presents with    Follow-up     HPI: Patient is here for establishment of cardiac care and evaluation of cardiac risk factors. Family history of premature heart disease in father and paternal GF along with cousin on his father's side with heart disease/MI/death in 52's. Patient feels well, without complaints. No reported chest pain, shortness of breath, palpitations, lightheadedness, syncope, LE edema, orthopnea, PND, or significant weight changes. Patient remains active without any increased fatigue out of the ordinary. Patient Active Problem List   Diagnosis    Screening for heart disease    Family history of heart disease    Mixed hyperlipidemia    Chest tightness     History reviewed. No pertinent past medical history.   Social History     Socioeconomic History    Marital status: /Civil Union     Spouse name: Not on file    Number of children: Not on file    Years of education: Not on file    Highest education level: Not on file   Occupational History    Not on file   Tobacco Use    Smoking status: Never    Smokeless tobacco: Never   Vaping Use    Vaping Use: Never used   Substance and Sexual Activity    Alcohol use: Yes     Comment: occasional     Drug use: Never    Sexual activity: Not on file   Other Topics Concern    Not on file   Social History Narrative    Not on file     Social Determinants of Health     Financial Resource Strain: Not on file   Food Insecurity: Not on file   Transportation Needs: Not on file   Physical Activity: Not on file   Stress: Not on file   Social Connections: Not on file   Intimate Partner Violence: Not on file   Housing Stability: Not on file      Family History   Problem Relation Age of Onset    Heart disease Father     Stroke Father      Past Surgical History:   Procedure Laterality Date    BACK SURGERY      Laminectomy        Current Outpatient Medications:     Ascorbic Acid (vitamin C) 1000 MG tablet, Take 1,000 mg by mouth daily, Disp: , Rfl:     Atorvastatin Calcium 20 MG/5ML SUSP, , Disp: , Rfl:   No Known Allergies  Vitals:    12/07/23 0825   BP: 130/84   BP Location: Left arm   Patient Position: Sitting   Pulse: 71   Resp: 18   SpO2: 98%   Weight: 90.4 kg (199 lb 3.2 oz)   Height: 5' 11" (1.803 m)       Labs:  Hospital Outpatient Visit on 10/31/2023   Component Date Value    Baseline HR 10/31/2023 65     Baseline BP 10/31/2023 142/86     O2 sat rest 10/31/2023 98     Stress peak HR 10/31/2023 146     Post peak BP 10/31/2023 210     O2 sat peak 10/31/2023 98     Recovery HR 10/31/2023 79     Recovery BP 10/31/2023 152/86     O2 sat recovery 10/31/2023 99     Max HR 10/31/2023 148     Max HR Percent 10/31/2023 88     Exercise duration (min) 10/31/2023 10     Exercise duration (sec) 10/31/2023 0     Estimated workload 10/31/2023 13.4     Rate Pressure Product 10/31/2023 30,660.0     Angina Index 10/31/2023 1     Stress Stage Reached 10/31/2023 4.0     [place]indo Treadmill Score 10/31/2023 1     ST Depression (mm) 10/31/2023 1.0     Protocol Name 10/31/2023 KATHYA     Exercise duration (min) 10/31/2023 10     Exercise duration (sec) 10/31/2023 0     Post Peak Systolic BP 97/57/1316 892     Max Diastolic Bp 84/24/4604 80     Peak HR 10/31/2023 148     Max Predicted Heart Rate 10/31/2023 167     Test Indication 10/31/2023 Screening for CAD     Target Hr Formular 10/31/2023 (220 - Age)*100%    Hospital Outpatient Visit on 10/31/2023   Component Date Value    A4C EF 10/31/2023 55     LVIDd 10/31/2023 4.90     LVIDS 10/31/2023 3.50     IVSd 10/31/2023 1. 10     LVPWd 10/31/2023 1.00     FS 10/31/2023 29     MV E' Tissue Velocity Se* 10/31/2023 11     MV E' Tissue Velocity La* 10/31/2023 12     LA Volume Index (BP) 10/31/2023 22.7     E/A ratio 10/31/2023 1.38     E wave deceleration time 10/31/2023 178     MV Peak E Jan 10/31/2023 90     MV Peak A Jan 10/31/2023 0.65     RVID d 10/31/2023 3.9     Tricuspid annular plane * 10/31/2023 2.40     LA size 10/31/2023 3.7     LA length (A2C) 10/31/2023 6.00     LA volume (BP) 10/31/2023 48     RAA A4C 10/31/2023 17.1     MV stenosis pressure 1/2* 10/31/2023 51     MV valve area p 1/2 meth* 10/31/2023 4.31     TR Peak Jan 10/31/2023 2.0     Triscuspid Valve Regurgi* 10/31/2023 15.0     Ao root 10/31/2023 3.60     Asc Ao 10/31/2023 3.8     Tricuspid valve peak reg* 10/31/2023 1.95     Left ventricular stroke * 10/31/2023 61.00     IVS 10/31/2023 1.1     LEFT VENTRICLE SYSTOLIC * 96/74/5104 50     LV DIASTOLIC VOLUME (MOD* 64/09/8492 111     Left Atrium Area-systoli* 10/31/2023 15     Left Atrium Area-systoli* 10/31/2023 18.7     LVSV, 2D 10/31/2023 61     LV EF 10/31/2023 55     Est. RA pres 10/31/2023 3.0     Right Ventricular Peak S* 10/31/2023 18.00      No results found for: "CHOL", "TRIG", "HDL", "LDLDIRECT"  Imaging: CT coronary calcium score    Result Date: 9/20/2023  Narrative: CT CORONARY ARTERY CALCIUM SCREENING WITHOUT INTRAVENOUS CONTRAST INDICATION:  Z91.89: Other specified personal risk factors, not elsewhere classified. Assess for calcific coronary plaque. Patient Age:  48 years Patient Gender:  Male. COMPARISON:  None. TECHNIQUE: Low radiation dose computed tomography of the heart was performed with EKG gating, suspended respiration, and without intravenous contrast.  This examination, like all CT scans performed in the [facility], was performed utilizing techniques to minimize radiation dose exposure, including the use of iterative reconstruction and automated exposure control.   Postprocessing was performed on a 3-D computer workstation to measure the amount of calcium in the coronary arteries. Imaging was limited to the heart and the immediately adjacent lungs. FINDINGS: Coronary artery calcium score breakdown is as follows: Left main coronary artery:  0 Left anterior descending coronary artery and diagonal branches:  0 Left circumflex coronary artery and left marginal branches:  19 Right coronary artery and right marginal branches:  0 Posterior descending coronary artery: 0 TOTAL coronary calcium score:  19 Calcium score PERCENTILE of age, race, and gender matched database participants in the Multi-Ethnic Study of Atherosclerosis (ARELLANO) trial:   72 HEART/GREAT VESSELS: No significant low radiation dose noncontrast CT abnormality of the heart. No thoracic aortic aneurysm. EXTRACARDIAC FINDINGS: No significant findings identified on limited small field of view low radiation dose noncontrast images of the chest at the level of the heart. Impression: Total coronary calcium score equals 19. For more useful information regarding the prognostic significance of the calcium score, please consult the calculator at the website [email or web address]://www.helen-[email or web address]/. aspx. Workstation performed: [id number]       Review of Systems:  Review of Systems   Constitutional:  Negative for activity change, appetite change, fatigue and fever. HENT:  Negative for nosebleeds and sore throat. Eyes:  Negative for photophobia and visual disturbance. Respiratory:  Negative for cough, chest tightness, shortness of breath and wheezing. Cardiovascular:  Negative for chest pain, palpitations and leg swelling. Gastrointestinal:  Negative for abdominal pain, diarrhea, nausea and vomiting. Endocrine: Negative for polyuria. Genitourinary:  Negative for dysuria, frequency and hematuria. Musculoskeletal:  Negative for arthralgias, back pain and gait problem. Skin:  Negative for pallor and rash.    Neurological:  Negative for dizziness, syncope, speech difficulty and light-headedness. Hematological:  Does not bruise/bleed easily. Psychiatric/Behavioral:  Negative for agitation, behavioral problems and confusion. Physical Exam:  Physical Exam  Vitals reviewed. Constitutional:       General: He is not in acute distress. Appearance: He is well-developed. He is not diaphoretic. HENT:      Head: Normocephalic and atraumatic. Nose: Nose normal.   Eyes:      General: No scleral icterus. Pupils: Pupils are equal, round, and reactive to light. Neck:      Vascular: No JVD. Cardiovascular:      Rate and Rhythm: Normal rate and regular rhythm. Heart sounds: S1 normal and S2 normal. Heart sounds not distant. No murmur heard. No systolic murmur is present. No friction rub. No gallop. No S3 sounds. Pulmonary:      Effort: Pulmonary effort is normal. No respiratory distress. Breath sounds: Normal breath sounds. No wheezing or rales. Abdominal:      General: Bowel sounds are normal. There is no distension. Palpations: Abdomen is soft. Musculoskeletal:         General: No deformity. Cervical back: Normal range of motion and neck supple. Skin:     General: Skin is warm and dry. Findings: No erythema. Neurological:      Mental Status: He is alert and oriented to person, place, and time. Cranial Nerves: No cranial nerve deficit. Psychiatric:         Behavior: Behavior normal.       Blood pressure 130/84, pulse 71, resp. rate 18, height 5' 11" (1.803 m), weight 90.4 kg (199 lb 3.2 oz), SpO2 98 %. EKG:  Normal sinus rhythm  Normal ECG    Discussion/Summary:  Evaluation of cardiac risk factors and screening for heart disease: Patient has a coronary artery calcium score of 19 in the left circumflex distribution, placing him in the 65th percentile for age and gender matched individuals.   Has cardiac risk factors including premature family history of heart disease, almost age, male, and hyperlipidemia currently on statin. Most recent lipid panel was done in Sept 2023 revealing  TG 89 HDL 65 and . Stress test in October 2023 revealed no significant ischemic changes with excellent exercise capacity and elevated blood pressure response to exercise with a peak blood pressure of 210 mmHg. This could explain some of the symptoms he was describing with shortness of breath with exertion. Since his baseline blood pressure is normal, we will hold off on medical management at the current time and follow blood pressure for potential need for agents in the future. Echocardiogram revealed normal LV size and function with normal diastology, normal LV thickness and no significant valvular disease.

## 2023-12-10 PROBLEM — Z13.6 SCREENING FOR HEART DISEASE: Status: RESOLVED | Noted: 2023-10-11 | Resolved: 2023-12-10
